# Patient Record
Sex: MALE | Race: BLACK OR AFRICAN AMERICAN | Employment: FULL TIME | URBAN - METROPOLITAN AREA
[De-identification: names, ages, dates, MRNs, and addresses within clinical notes are randomized per-mention and may not be internally consistent; named-entity substitution may affect disease eponyms.]

---

## 2018-07-19 ENCOUNTER — HOSPITAL ENCOUNTER (OUTPATIENT)
Dept: PHYSICAL THERAPY | Age: 43
End: 2018-07-19

## 2018-07-26 ENCOUNTER — HOSPITAL ENCOUNTER (OUTPATIENT)
Dept: PHYSICAL THERAPY | Age: 43
Discharge: HOME | End: 2018-07-26
Payer: OTHER GOVERNMENT

## 2018-07-26 PROCEDURE — 97162 PT EVAL MOD COMPLEX 30 MIN: CPT

## 2018-07-26 PROCEDURE — 97110 THERAPEUTIC EXERCISES: CPT

## 2018-07-26 NOTE — PROGRESS NOTES
[facility]  [address]  [place], [address]  [phone] fax    Plan of Care/ Statement of Necessity for Physical Therapy Services    Patient name: Lowell Ernandez Start of Care: 2018   Referral source: Rishabh Brandon : 1975    Medical Diagnosis: Low back pain [M54.5]   Onset Date:chronic    Treatment Diagnosis: LBP   Prior Hospitalization: see medical history Provider#: [id number]   Medications: Verified on Patient summary List    Comorbidities: none reported   Prior Level of Function: Pt reports I with ADLs and work duties with intermittent back pain for 10 years     The Plan of Care and following information is based on the information from the initial evaluation. Assessment/ key information: Pt is a 36 y/o M presenting with c/o LBP of chronic nature \"since the [employer]\". He reports no history of injuries or SUSAN. Pt reports his pain is worst with prolonged positions sitting or standing and that he occasionally experiences left LE paresthesias when lying supine. Unable to reproduce any paresthesias today, pt does have back pain reports with trunk extension and left SB, as well as CPA's to L5-S1 region. Increased lumbar lordosis noted with limited posterior pelvic rotation. Pt would benefit from PT to improve core activation and lumbopelvic mechanics to improve ADL ease.     Evaluation Complexity History LOW Complexity : Zero comorbidities / personal factors that will impact the outcome / POC; Examination MEDIUM Complexity : 3 Standardized tests and measures addressing body structure, function, activity limitation and / or participation in recreation  ;Presentation LOW Complexity : Stable, uncomplicated  ;Clinical Decision Making MEDIUM Complexity : FOTO score of 26-74  Overall Complexity Rating: MEDIUM  Problem List: pain affecting function, decrease ROM, decrease strength, decrease ADL/ functional abilitiies, decrease activity tolerance and decrease flexibility/ joint mobility   Treatment Plan may include any combination of the following: Therapeutic exercise, Therapeutic activities, Neuromuscular re-education, Physical agent/modality, Gait/balance training, Manual therapy, Patient education, Self Care training and Functional mobility training  Patient / Family readiness to learn indicated by: trying to perform skills  Persons(s) to be included in education: patient (P)  Barriers to Learning/Limitations: None  Patient Goal (s): Maybe some techniques to loosen up  Patient Self Reported Health Status: good  Rehabilitation Potential: fair-good    Short Term Goals: To be accomplished in 2 weeks:  1. Pt will demonstrate I and compliance with HEP to promote self management of symptoms. 2. Pt will demonstrate proper PPT form to improve lumbopelvic mechanics with functional tasks. Long Term Goals: To be accomplished in 4 weeks:  1. Pt will demonstrate trunk flexion and SB ROM 75% of WNL without pain for improved ADL ease. 2. Pt will perform QP interventions without loss of neutral pelvic posture or spine to improve core stability. 3. Pt will report no difficulty changing positions quickly to improve functional mobility and quality of life. 4. Pt will demonstrate proper squat lift mechanics without lumbar pain increase to improve functional task performance. Frequency / Duration: Patient to be seen 2 times per week for 4 weeks. Patient/ Caregiver education and instruction: Diagnosis, prognosis, self care and exercises   [x]  Plan of care has been reviewed with MELANIE Dobson DPT, CMTPT 7/26/2018 5:48 PM    ________________________________________________________________________    I certify that the above Therapy Services are being furnished while the patient is under my care. I agree with the treatment plan and certify that this therapy is necessary.     Physician's Signature:____________________ Date:____________Time: _________    Please sign and return to [facility] [address] [address] [address]  [place], [facility] [address]  [phone] fax

## 2018-07-26 NOTE — PROGRESS NOTES
PT DAILY TREATMENT NOTE     Patient Name: Venu Palacios  Date:2018  : 1975  [x]  Patient  Verified  Payor: Kisha Reece / Plan: Kiki Members / Product Type: Federal Funded Programs /    In time:5:12  Out time:5:45  Total Treatment Time (min): 33  Visit #: 1 of 8    Treatment Area: Low back pain [M54.5]    SUBJECTIVE  Pain Level (0-10 scale): 3  Any medication changes, allergies to medications, adverse drug reactions, diagnosis change, or new procedure performed?: [x] No    [] Yes (see summary sheet for update)  Subjective functional status/changes:   [] No changes reported  Pt reports pain is worst with prolonged postures    OBJECTIVE    20 min [x]Eval                  []Re-Eval       13 min Therapeutic Exercise:  [] See flow sheet :   Rationale: increase strength and improve coordination to improve the patients ability to perform daily tasks      With   [] TE   [] TA   [] neuro   [] other: Patient Education: [x] Review HEP    [] Progressed/Changed HEP based on:   [] positioning   [] body mechanics   [] transfers   [] heat/ice application    [] other:      Other Objective/Functional Measures: see initial eval     Pain Level (0-10 scale) post treatment: 3    ASSESSMENT/Changes in Function: see POC    Patient will continue to benefit from skilled PT services to modify and progress therapeutic interventions, address functional mobility deficits, address ROM deficits, address strength deficits, analyze and address soft tissue restrictions, analyze and cue movement patterns and analyze and modify body mechanics/ergonomics to attain remaining goals. [x]  See Plan of Care  []  See progress note/recertification  []  See Discharge Summary         Progress towards goals / Updated goals:  Short Term Goals: To be accomplished in 2 weeks:  1. Pt will demonstrate I and compliance with HEP to promote self management of symptoms.   2. Pt will demonstrate proper PPT form to improve lumbopelvic mechanics with functional tasks. Long Term Goals: To be accomplished in 4 weeks:  1. Pt will demonstrate trunk flexion and SB ROM 75% of WNL without pain for improved ADL ease. 2. Pt will perform QP interventions without loss of neutral pelvic posture or spine to improve core stability. 3. Pt will report no difficulty changing positions quickly to improve functional mobility and quality of life. 4. Pt will demonstrate proper squat lift mechanics without lumbar pain increase to improve functional task performance.     PLAN  []  Upgrade activities as tolerated     [x]  Continue plan of care  []  Update interventions per flow sheet       []  Discharge due to:_  []  Other:_      Lotus Golden DPT, CMTPT 7/26/2018  6:08 PM    Future Appointments  Date Time Provider Chandana Baez   7/31/2018 5:00 PM Vince [address]a [address] [facility]   8/2/2018 5:30 PM Vince [address] [facility]   8/7/2018 4:30 PM [id number] [facility]   8/9/2018 5:00 PM Donnie Morel [facility]   8/14/2018 5:00 PM [id number] [facility]   8/16/2018 5:00 PM Thalia Morel [facility]   8/21/2018 5:00 PM Thalia Morel [facility]

## 2018-07-31 ENCOUNTER — HOSPITAL ENCOUNTER (OUTPATIENT)
Dept: PHYSICAL THERAPY | Age: 43
Discharge: HOME | End: 2018-07-31
Payer: OTHER GOVERNMENT

## 2018-07-31 PROCEDURE — 97112 NEUROMUSCULAR REEDUCATION: CPT

## 2018-07-31 PROCEDURE — 97110 THERAPEUTIC EXERCISES: CPT

## 2018-07-31 NOTE — PROGRESS NOTES
PT DAILY TREATMENT NOTE 3-16 Patient Name: Ryan Dobson Date:2018 : 1975 [x]  Patient  Verified Payor: Ion Munoz / Plan: Nando Lyme / Product Type: Federal Funded Programs / In time:5:03  Out time:5:42 Total Treatment Time (min): 39 Visit #: 2 of 8 Treatment Area: Low back pain [M54.5] SUBJECTIVE Pain Level (0-10 scale): 0 Any medication changes, allergies to medications, adverse drug reactions, diagnosis change, or new procedure performed?: [x] No    [] Yes (see summary sheet for update) Subjective functional status/changes:   [] No changes reported \"I have been doing the exercises at home every other day. \" OBJECTIVE 10 min Therapeutic Exercise:  [x] See flow sheet :  
Rationale: increase ROM, increase strength and improve coordination to improve the patients ability to increase ease with ADLs 29 min Neuromuscular Re-education:  [x]  See flow sheet :  
Rationale: increase ROM, increase strength, improve coordination, improve balance and increase proprioception  to improve core and pelvic awareness With 
 [] TE 
 [] TA 
 [] neuro 
 [] other: Patient Education: [x] Review HEP [] Progressed/Changed HEP based on:  
[] positioning   [] body mechanics   [] transfers   [] heat/ice application   
[] other:   
 
Other Objective/Functional Measures:  
First follow up session---cues sequencing and correct form throughout Pain Level (0-10 scale) post treatment: 0 
 
ASSESSMENT/Changes in Function:  
Initiated POC per flowsheet. Patient puts forth good effort with exercises and reports semi-compliance with HEP. Poor core awareness and spinal segmental mobility. Continue per POC.   
 
Patient will continue to benefit from skilled PT services to modify and progress therapeutic interventions, address functional mobility deficits, address ROM deficits, address strength deficits, analyze and address soft tissue restrictions, analyze and cue movement patterns, analyze and modify body mechanics/ergonomics and assess and modify postural abnormalities to attain remaining goals. []  See Plan of Care 
[]  See progress note/recertification 
[]  See Discharge Summary Progress towards goals / Updated goals: 
Short Term Goals: To be accomplished in 2 weeks: 1. Pt will demonstrate I and compliance with HEP to promote self management of symptoms. reports semi compliance (7/31/2018) 2. Pt will demonstrate proper PPT form to improve lumbopelvic mechanics with functional tasks. Long Term Goals: To be accomplished in 4 weeks: 1. Pt will demonstrate trunk flexion and SB ROM 75% of WNL without pain for improved ADL ease. 2. Pt will perform QP interventions without loss of neutral pelvic posture or spine to improve core stability. 3. Pt will report no difficulty changing positions quickly to improve functional mobility and quality of life. 4. Pt will demonstrate proper squat lift mechanics without lumbar pain increase to improve functional task performance. PLAN 
[]  Upgrade activities as tolerated     [x]  Continue plan of care 
[]  Update interventions per flow sheet      
[]  Discharge due to:_ 
[]  Other:_   
 
Braxton Salgadodden 7/31/2018  5:04 PM 
 
Future Appointments Date Time Provider Chandana Baez 8/2/2018 5:30 PM Braxton Duncan [facility]  
8/7/2018 4:30 PM [id number] [place] [address]  
8/9/2018 5:00 PM Thalia Morel [facility]  
8/14/2018 5:00 PM [id number] [place] [address]  
8/16/2018 5:00 PM Thalia Morel [facility]  
8/21/2018 5:00 PM Thalia Morel [facility]

## 2018-08-02 ENCOUNTER — APPOINTMENT (OUTPATIENT)
Dept: PHYSICAL THERAPY | Age: 43
End: 2018-08-02
Payer: OTHER GOVERNMENT

## 2018-08-07 ENCOUNTER — APPOINTMENT (OUTPATIENT)
Dept: PHYSICAL THERAPY | Age: 43
End: 2018-08-07
Payer: OTHER GOVERNMENT

## 2018-08-09 ENCOUNTER — APPOINTMENT (OUTPATIENT)
Dept: PHYSICAL THERAPY | Age: 43
End: 2018-08-09
Payer: OTHER GOVERNMENT

## 2018-08-14 ENCOUNTER — APPOINTMENT (OUTPATIENT)
Dept: PHYSICAL THERAPY | Age: 43
End: 2018-08-14
Payer: OTHER GOVERNMENT

## 2018-08-16 ENCOUNTER — HOSPITAL ENCOUNTER (OUTPATIENT)
Dept: PHYSICAL THERAPY | Age: 43
Discharge: HOME | End: 2018-08-16
Payer: OTHER GOVERNMENT

## 2018-08-16 PROCEDURE — 97112 NEUROMUSCULAR REEDUCATION: CPT

## 2018-08-16 PROCEDURE — 97110 THERAPEUTIC EXERCISES: CPT

## 2018-08-16 NOTE — PROGRESS NOTES
PT DAILY TREATMENT NOTE 3-16    Patient Name: Concepcion Dutton  Date:2018  : 1975  [x]  Patient  Verified  Payor: Milan Sotomayor / Plan: Adrienne Sites / Product Type: Federal Funded Programs /    In time:5:00  Out time:5:36  Total Treatment Time (min): 36  Visit #: 3 of 8    Treatment Area: Low back pain [M54.5]    SUBJECTIVE  Pain Level (0-10 scale): 0  Any medication changes, allergies to medications, adverse drug reactions, diagnosis change, or new procedure performed?: [x] No    [] Yes (see summary sheet for update)  Subjective functional status/changes:   [] No changes reported  \"It only hurts when I get out of bed or sit for a long period of time. \" Patient reports he has been unable to make previous PT appointments secondary to work. OBJECTIVE  10 min Therapeutic Exercise:  [x] See flow sheet :   Rationale: increase ROM, increase strength and improve coordination to improve the patients ability to increase ease with ADLs    26 min Neuromuscular Re-education:  [x]  See flow sheet :   Rationale: increase ROM, increase strength, improve coordination and increase proprioception  to improve l/s segmental mobility and pelvic stability              With   [] TE   [] TA   [] neuro   [] other: Patient Education: [x] Review HEP    [] Progressed/Changed HEP based on:   [] positioning   [] body mechanics   [] transfers   [] heat/ice application    [] other:      Other Objective/Functional Measures:   Poor pelvic awareness     Pain Level (0-10 scale) post treatment: 0    ASSESSMENT/Changes in Function:   Continues with poor spinal segmental mobility and decreased local stabilizer activation.      Patient will continue to benefit from skilled PT services to modify and progress therapeutic interventions, address functional mobility deficits, address ROM deficits, address strength deficits, analyze and address soft tissue restrictions, analyze and cue movement patterns, analyze and modify body mechanics/ergonomics and assess and modify postural abnormalities to attain remaining goals. []  See Plan of Care  []  See progress note/recertification  []  See Discharge Summary         Progress towards goals / Updated goals:  Short Term Goals: To be accomplished in 2 weeks:  1. Pt will demonstrate I and compliance with HEP to promote self management of symptoms. reports semi compliance (7/31/2018)  2. Pt will demonstrate proper PPT form to improve lumbopelvic mechanics with functional tasks. -requires maximal cues for correct form (8/16/2018)  [address] be accomplished in 4 weeks:  1. Pt will demonstrate trunk flexion and SB ROM 75% of WNL without pain for improved ADL ease. 2. Pt will perform QP interventions without loss of neutral pelvic posture or spine to improve core stability. 3. Pt will report no difficulty changing positions quickly to improve functional mobility and quality of life. 4. Pt will demonstrate proper squat lift mechanics without lumbar pain increase to improve functional task performance.     PLAN  []  Upgrade activities as tolerated     [x]  Continue plan of care  []  Update interventions per flow sheet       []  Discharge due to:_  []  Other:_      Pamila Fleischer 8/16/2018  4:54 PM    Future Appointments  Date Time Provider Chandana Baez   8/16/2018 5:00 PM Vince [address]a [address] [facility]   8/21/2018 5:00 PM Pamila Fleischer [facility]

## 2018-08-21 ENCOUNTER — HOSPITAL ENCOUNTER (OUTPATIENT)
Dept: PHYSICAL THERAPY | Age: 43
Discharge: HOME | End: 2018-08-21
Payer: OTHER GOVERNMENT

## 2018-08-21 PROCEDURE — 97112 NEUROMUSCULAR REEDUCATION: CPT

## 2018-08-21 PROCEDURE — 97110 THERAPEUTIC EXERCISES: CPT

## 2018-08-21 NOTE — PROGRESS NOTES
PT DAILY TREATMENT NOTE 3-16    Patient Name: Vallerie Koyanagi  Date:2018  : 1975  [x]  Patient  Verified  Payor: Val Becerril / Plan: Romero Sultana / Product Type: Federal Funded Programs /    In time:4:58  Out time:5:30  Total Treatment Time (min): 32  Visit #: 4 of 8    Treatment Area: Low back pain [M54.5]    SUBJECTIVE  Pain Level (0-10 scale): 0  Any medication changes, allergies to medications, adverse drug reactions, diagnosis change, or new procedure performed?: [x] No    [] Yes (see summary sheet for update)  Subjective functional status/changes:   [] No changes reported  Patient reports no new complaints. OBJECTIVE  8 min Therapeutic Exercise:  [x] See flow sheet :   Rationale: increase ROM, increase strength and improve coordination to improve the patients ability to increase ease with ADLs    24 min Neuromuscular Re-education:  [x]  See flow sheet :   Rationale: increase ROM, increase strength, improve coordination and increase proprioception  to improve l/s segmental mobility and pelvic stability            With   [] TE   [] TA   [] neuro   [] other: Patient Education: [x] Review HEP    [] Progressed/Changed HEP based on:   [] positioning   [] body mechanics   [] transfers   [] heat/ice application    [] other:      Other Objective/Functional Measures:      Pain Level (0-10 scale) post treatment: 0    ASSESSMENT/Changes in Function:   Patient has made slower progress towards goals. His reports of HEP semi-compliance and due to cancelling some appointments secondary to reports of work contributing. He demonstrates slight improvement in trunk AROM and his pelvic awareness is gradually improving. Discussed attendance policy with patient to which he reports understanding. Will continue 2x2 weeks.      Patient will continue to benefit from skilled PT services to modify and progress therapeutic interventions, address functional mobility deficits, address ROM deficits, address strength deficits, analyze and address soft tissue restrictions, analyze and cue movement patterns, analyze and modify body mechanics/ergonomics and assess and modify postural abnormalities to attain remaining goals. []  See Plan of Care  [x]  See progress note/recertification  []  See Discharge Summary         Progress towards goals / Updated goals:  Short Term Goals: To be accomplished in 2 weeks:  1. Pt will demonstrate I and compliance with HEP to promote self management of symptoms. reports semi compliance (7/31/2018)  2. Pt will demonstrate proper PPT form to improve lumbopelvic mechanics with functional tasks. -requires maximal cues for correct form (8/16/2018)  [address] be accomplished in 4 weeks:  1. Pt will demonstrate trunk flexion and SB ROM 75% of WNL without pain for improved ADL ease.-trunk flexion, 75%. SB, 50% (8/20/2018)  2. Pt will perform QP interventions without loss of neutral pelvic posture or spine to improve core stability. -met with UE lift (8/20/2018)  3. Pt will report no difficulty changing positions quickly to improve functional mobility and quality of life. -not met, patient reports pain (8/20/2018)  4. Pt will demonstrate proper squat lift mechanics without lumbar pain increase to improve functional task performance. fair squat--cues to maintain TA engagement though good hip hinge noted (8/20/2018)    PLAN  []  Upgrade activities as tolerated     [x]  Continue plan of care  []  Update interventions per flow sheet       []  Discharge due to:_  []  Other:_      Akua Marcos 8/21/2018  4:57 PM    Future Appointments  Date Time Provider Chandana Baez   8/21/2018 5:00 [facility]

## 2018-08-23 NOTE — PROGRESS NOTES
[facility]  [address] [facility] [address]  [place], [address]  [phone] fax    Physical Therapy Progress Note  Patient name: Eliana Messina Start of Care: 2018   Referral source: Celestino Perales : 1975                          Medical Diagnosis: Low back pain [M54.5] Onset Date:chronic                          Treatment Diagnosis: LBP   Prior Hospitalization: see medical history Provider#: [id number]   Medications: Verified on Patient summary List    Comorbidities: none reported   Prior Level of Function: Pt reports I with ADLs and work duties with intermittent back pain for 10 years  Visits from Start of Care: 4    Missed Visits: 3      Established Goals:        Excellent         Good         Limited            None  [x] Increased ROM   []  [x]  []  []  [x] Increased Strength  []  []  [x]  []  [] Increased Mobility  []  []  []  []   [] Decreased Pain   []  []  []  []  [] Decreased Swelling  []  []  []  []    Key Functional Changes:   Short Term Goals: To be accomplished in 2 weeks:  1. Pt will demonstrate I and compliance with HEP to promote self management of symptoms. reports semi compliance (2018)  2. Pt will demonstrate proper PPT form to improve lumbopelvic mechanics with functional tasks. -requires maximal cues for correct form (2018)  [address] be accomplished in 4 weeks:  1. Pt will demonstrate trunk flexion and SB ROM 75% of WNL without pain for improved ADL ease.-trunk flexion, 75%. SB, 50% (2018)  2. Pt will perform QP interventions without loss of neutral pelvic posture or spine to improve core stability. -met with UE lift (2018)  3. Pt will report no difficulty changing positions quickly to improve functional mobility and quality of life. -not met, patient reports pain (2018)  4.  Pt will demonstrate proper squat lift mechanics without lumbar pain increase to improve functional task performance. fair squat--cues to maintain TA engagement though good hip hinge noted (8/20/2018)    Updated Goals: to be achieved in 2 weeks:  Short Term Goals: To be accomplished in 2 weeks:  1. Pt will demonstrate I and compliance with HEP to promote self management of symptoms. reports semi compliance (7/31/2018)  2. Pt will demonstrate proper PPT form to improve lumbopelvic mechanics with functional tasks. -requires maximal cues for correct form (8/16/2018)  [address] be accomplished in 4 weeks:  1. Pt will demonstrate trunk flexion and SB ROM 75% of WNL without pain for improved ADL ease.-trunk flexion, 75%. SB, 50% (8/20/2018)  2. Pt will perform QP interventions without loss of neutral pelvic posture or spine to improve core stability. -met with UE lift (8/20/2018)  3. Pt will report no difficulty changing positions quickly to improve functional mobility and quality of life. -not met, patient reports pain (8/20/2018)  4. Pt will demonstrate proper squat lift mechanics without lumbar pain increase to improve functional task performance. fair squat--cues to maintain TA engagement though good hip hinge noted (8/20/2018)    ASSESSMENT/RECOMMENDATIONS: Pt has been slow with progress thus far due to limited HEP compliance and attendance.  Will continue PT for 2 more weeks progressing lumbopelvic mechanics    [x]Continue therapy per initial plan/protocol at a frequency of  2 x per week for 2 weeks  []Continue therapy with the following recommended changes:_____________________      _____________________________________________________________________  []Discontinue therapy progressing towards or have reached established goals  []Discontinue therapy due to lack of appreciable progress towards goals  []Discontinue therapy due to lack of attendance or compliance  []Await Physician's recommendations/decisions regarding therapy  []Other:________________________________________________________________    Thank you for this referral.    JASS CruzT, CMTPT 8/23/2018 7:25 AM  NOTE TO PHYSICIAN:  PLEASE COMPLETE THE ORDERS BELOW AND   FAX TO [facility]: [phone]  If you are unable to process this request in 24 hours please contact our office: [phone]    ? I have read the above report and request that my patient continue as recommended. ? I have read the above report and request that my patient continue therapy with the following changes/special instructions:__________________________________________________________  ? I have read the above report and request that my patient be discharged from therapy.     Physicians signature: ______________________________Date: ______Time:______

## 2018-08-30 ENCOUNTER — APPOINTMENT (OUTPATIENT)
Dept: PHYSICAL THERAPY | Age: 43
End: 2018-08-30
Payer: OTHER GOVERNMENT

## 2018-09-04 ENCOUNTER — APPOINTMENT (OUTPATIENT)
Dept: PHYSICAL THERAPY | Age: 43
End: 2018-09-04

## 2018-09-11 ENCOUNTER — APPOINTMENT (OUTPATIENT)
Dept: PHYSICAL THERAPY | Age: 43
End: 2018-09-11

## 2018-09-13 ENCOUNTER — APPOINTMENT (OUTPATIENT)
Dept: PHYSICAL THERAPY | Age: 43
End: 2018-09-13

## 2018-10-15 NOTE — PROGRESS NOTES
In [address] [place] [address] [place], [address] 
[phone] fax Physical Therapy Discharge Summary Patient name: Concepcion Dutton Start of Care: 2018 Referral source: Gala Mahmood* : 1975                         
Medical Diagnosis: Low back pain [M54.5] Onset Date:chronic                         
Treatment Diagnosis: LBP Prior Hospitalization: see medical history Provider#: [id number] Medications: Verified on Patient summary List  
 Comorbidities: none reported 
 Prior Level of Function: Pt reports I with ADLs and work duties with intermittent back pain for 10 years Visits from Start of Care: 4    Missed Visits: 4 Reporting Period : 2018 to 2018 Summary of Care: 
Updated Goals: to be achieved in 2 weeks: 
Short Term Goals: To be accomplished in 2 weeks: 1. Pt will demonstrate I and compliance with HEP to promote self management of symptoms. reports semi compliance (2018) 2. Pt will demonstrate proper PPT form to improve lumbopelvic mechanics with functional tasks. -requires maximal cues for correct form (2018) Long Term Goals: To be accomplished in 4 weeks: 1. Pt will demonstrate trunk flexion and SB ROM 75% of WNL without pain for improved ADL ease.-trunk flexion, 75%. SB, 50% (2018) 2. Pt will perform QP interventions without loss of neutral pelvic posture or spine to improve core stability. -met with UE lift (2018) 3. Pt will report no difficulty changing positions quickly to improve functional mobility and quality of life. -not met, patient reports pain (2018) 4. Pt will demonstrate proper squat lift mechanics without lumbar pain increase to improve functional task performance. fair squat--cues to maintain TA engagement though good hip hinge noted (2018) ASSESSMENT/RECOMMENDATIONS: Pt with poor PT attendance, thus contributing to limited improvement. Will be D/C at this time per attendance policy. [x]Discontinue therapy: []Patient has reached or is progressing toward set goals [x]Patient is non-compliant or has abdicated 
    []Due to lack of appreciable progress towards set goals Faviola Torres DPT, CMTPT 10/15/2018 12:43 PM